# Patient Record
Sex: FEMALE | Race: WHITE | NOT HISPANIC OR LATINO | Employment: STUDENT | RURAL
[De-identification: names, ages, dates, MRNs, and addresses within clinical notes are randomized per-mention and may not be internally consistent; named-entity substitution may affect disease eponyms.]

---
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
## 2025-01-30 PROBLEM — J06.9 VIRAL UPPER RESPIRATORY TRACT INFECTION WITH COUGH: Status: RESOLVED | Noted: 2022-03-03 | Resolved: 2025-01-30

## 2025-02-27 ENCOUNTER — OFFICE VISIT (OUTPATIENT)
Dept: FAMILY MEDICINE | Facility: CLINIC | Age: 20
End: 2025-02-27
Payer: COMMERCIAL

## 2025-02-27 VITALS
HEIGHT: 68 IN | SYSTOLIC BLOOD PRESSURE: 110 MMHG | RESPIRATION RATE: 18 BRPM | TEMPERATURE: 99 F | WEIGHT: 152 LBS | OXYGEN SATURATION: 97 % | DIASTOLIC BLOOD PRESSURE: 74 MMHG | HEART RATE: 96 BPM | BODY MASS INDEX: 23.04 KG/M2

## 2025-02-27 DIAGNOSIS — J22 LRTI (LOWER RESPIRATORY TRACT INFECTION): ICD-10-CM

## 2025-02-27 DIAGNOSIS — J03.90 TONSILLITIS: Primary | ICD-10-CM

## 2025-02-27 LAB
CTP QC/QA: YES
MOLECULAR STREP A: NEGATIVE
POC MOLECULAR INFLUENZA A AGN: NEGATIVE
POC MOLECULAR INFLUENZA B AGN: NEGATIVE
SARS-COV-2 RDRP RESP QL NAA+PROBE: NEGATIVE

## 2025-02-27 PROCEDURE — 87502 INFLUENZA DNA AMP PROBE: CPT | Mod: QW,,, | Performed by: NURSE PRACTITIONER

## 2025-02-27 PROCEDURE — 87651 STREP A DNA AMP PROBE: CPT | Mod: QW,,, | Performed by: NURSE PRACTITIONER

## 2025-02-27 RX ORDER — DEXAMETHASONE SODIUM PHOSPHATE 4 MG/ML
6 INJECTION, SOLUTION INTRA-ARTICULAR; INTRALESIONAL; INTRAMUSCULAR; INTRAVENOUS; SOFT TISSUE
Status: COMPLETED | OUTPATIENT
Start: 2025-02-27 | End: 2025-02-27

## 2025-02-27 RX ORDER — AZITHROMYCIN 250 MG/1
TABLET, FILM COATED ORAL
Qty: 6 TABLET | Refills: 0 | Status: SHIPPED | OUTPATIENT
Start: 2025-02-27

## 2025-02-27 RX ORDER — CEFTRIAXONE 1 G/1
1 INJECTION, POWDER, FOR SOLUTION INTRAMUSCULAR; INTRAVENOUS
Status: COMPLETED | OUTPATIENT
Start: 2025-02-27 | End: 2025-02-27

## 2025-02-27 RX ADMIN — CEFTRIAXONE 1 G: 1 INJECTION, POWDER, FOR SOLUTION INTRAMUSCULAR; INTRAVENOUS at 09:02

## 2025-02-27 RX ADMIN — DEXAMETHASONE SODIUM PHOSPHATE 6 MG: 4 INJECTION, SOLUTION INTRA-ARTICULAR; INTRALESIONAL; INTRAMUSCULAR; INTRAVENOUS; SOFT TISSUE at 08:02

## 2025-02-27 NOTE — LETTER
February 27, 2025    Crow Izaguirre  [address]  [place] [address]             [facility]  Family Medicine  [address] [place] [address]  Phone: [phone]  Fax: [phone]   February 27, 2025     Patient: Crow Izaguirre   YOB: 2005   Date of Visit: 2/27/2025       To Whom it May Concern:    Crow Izaguirre was seen in my clinic on 2/27/2025. She may return to school on 03/03/2025 .    Please excuse her from any classes or work missed.    If you have any questions or concerns, please don't hesitate to call.    Sincerely,         Abigail Hernandez, NASHP

## 2025-02-27 NOTE — PROGRESS NOTES
[facility]  [address] [place] [address]  Phone: [phone]  Fax: [phone]       PATIENT NAME: Crow Izaguirre   : 2005    AGE: 19 y.o. DATE OF ENCOUNTER: 25    MRN: [id number]      PCP: Abigail Hernandez FNP    Subjective:   CHANGE CHIEF COMPLAINT      :[id number]}274}  Chief Complaint   Patient presents with    URI     Patient presents to clinic with complaints of sore throat, body aches, and fever since monday     History of Present Illness    HPI:  Patient became ill 4 days ago with intermittent fever, primarily occurring at night. Initial fever measurement was 102°F, but subsequent measurements with a different thermometer showed 99.2°F, suggesting a possible issue with one of the thermometers. She has been coughing and expectorating a small amount of sputum. She reports significant sinus pain and congestion, as well as severe throat pain. She had an earache yesterday. Patient does not recall being sick this year, but the clinician reminded her of an illness around [facility] in November, which required a follow-up evaluation at the [facility] about a week after the initial consultation.  She denies shortness of breath and wheezing. Tests for COVID-19 and influenza were negative.      ROS:  Constitutional: +fevers  ENT: +sinus pressure, +sore throat  Respiratory: -shortness of breath, +cough, -wheezing  Psychiatric: +anxiety         Allergies and Meds: 274}     Review of patient's allergies indicates:  No Known Allergies     Current Medications[1]    Labs:274}     Point Of Care Testing (if applicable):  Lab Results   Component Value Date    [id number] Negative 2025    FLUAMOLEC Negative 2025    FLUBMOLEC Negative 2025    MOLSTREPAPOC Negative 2025     Any diagnostic testing results obtained in office or prior to appointment were reviewed were reviewed with patient.    Medical History: 274}     Past Medical History:  "  Diagnosis Date    Dyslexia       Tobacco Use History[2]   Past Surgical History:   Procedure Laterality Date    KNEE ARTHROSCOPY W/ ACL RECONSTRUCTION Right 12/28/2021    Dr. Payne in [place]        Health Maintenance:      Health Maintenance Topics with due status: Not Due       Topic Last Completion Date    TETANUS VACCINE 08/30/2016    RSV Vaccine (Age 60+ and Pregnant patients) Not Due       Objective:  274}   Vital Signs  Temp: 98.8 °F (37.1 °C)  Temp Source: Oral  Pulse: 96  Resp: 18  SpO2: 97 %  BP: 110/74  BP Location: Left arm  Patient Position: Sitting  Pain Score: 0-No pain  Height and Weight  Height: 5' 7.99" (172.7 cm)  Weight: 68.9 kg (152 lb)  BSA (Calculated - sq m): 1.82 sq meters  BMI (Calculated): 23.1  Weight in (lb) to have BMI = 25: 164    Over the last two weeks how often have you been bothered by little interest or pleasure in doing things: 0  Over the last two weeks how often have you been bothered by feeling down, depressed or hopeless: 0  PHQ-2 Total Score: 0    Wt Readings from Last 3 Encounters:   02/27/25 68.9 kg (152 lb) (82%, Z= 0.91)*   11/27/24 70.3 kg (155 lb) (85%, Z= 1.02)*   11/21/24 71.8 kg (158 lb 6.4 oz) (87%, Z= 1.11)*     * Growth percentiles are based on CDC (Girls, 2-20 Years) data.     Physical Exam  Vitals and nursing note reviewed.   Constitutional:       General: She is not in acute distress.     Appearance: Normal appearance. She is ill-appearing.   HENT:      Head: Normocephalic.      Right Ear: Hearing, tympanic membrane, ear canal and external ear normal.      Left Ear: Hearing, tympanic membrane, ear canal and external ear normal.      Nose: Congestion present. No rhinorrhea.      Right Sinus: Maxillary sinus tenderness present. No frontal sinus tenderness.      Left Sinus: Maxillary sinus tenderness present. No frontal sinus tenderness.      Mouth/Throat:      Lips: Pink.      Mouth: Mucous membranes are moist. No oral lesions.      Tongue: No lesions.      " Pharynx: Uvula midline. Posterior oropharyngeal erythema and postnasal drip present. No pharyngeal swelling, oropharyngeal exudate or uvula swelling.      Tonsils: No tonsillar exudate or tonsillar abscesses. 3+ on the right. 1+ on the left.   Eyes:      General: No scleral icterus.        Right eye: No discharge.         Left eye: No discharge.      Conjunctiva/sclera: Conjunctivae normal.      Pupils: Pupils are equal, round, and reactive to light.   Cardiovascular:      Rate and Rhythm: Normal rate and regular rhythm.      Pulses: Normal pulses.      Heart sounds: Normal heart sounds.   Pulmonary:      Effort: Pulmonary effort is normal. No respiratory distress.      Breath sounds: Normal breath sounds. No wheezing, rhonchi or rales.   Musculoskeletal:      Cervical back: No rigidity.   Lymphadenopathy:      Cervical: Cervical adenopathy (anterior, bilateral) present.   Skin:     General: Skin is warm and dry.      Coloration: Skin is not jaundiced or pale.      Findings: No rash.   Neurological:      General: No focal deficit present.      Mental Status: She is alert and oriented to person, place, and time.      Gait: Gait normal.   Psychiatric:         Mood and Affect: Mood normal.         Behavior: Behavior normal.         Thought Content: Thought content normal.         Judgment: Judgment normal.          Assessment and Plan: 274}       1. Tonsillitis  -     dexAMETHasone injection 6 mg  -     cefTRIAXone injection 1 g  -     azithromycin (Z-AYDEN) 250 MG tablet; Take 2 tablets by mouth on day 1; Take 1 tablet by mouth on days 2-5  Dispense: 6 tablet; Refill: 0    2. LRTI (lower respiratory tract infection)  -     POCT COVID-19 Rapid Screening  -     POCT Influenza A/B Molecular  -     POCT Strep A, Molecular  -     dexAMETHasone injection 6 mg  -     cefTRIAXone injection 1 g  -     azithromycin (Z-AYDEN) 250 MG tablet; Take 2 tablets by mouth on day 1; Take 1 tablet by mouth on days 2-5  Dispense: 6 tablet;  Refill: 0        Assessment & Plan    IMPRESSION:  - Assessed patient with symptoms suggestive of strep throat, including enlarged tonsils (right tonsil notably swollen) and lymph nodes  - Considered possibility of strep infection not detectable by rapid strep A test  - Evaluated for potential respiratory infection and tonsillitis  - Decided on combination antibiotic therapy to cover various strains of strep and possible walking pneumonia    RESPIRATORY INFECTION WITH TONSILLITIS:  - Assessed the patient's condition as a respiratory infection with tonsillitis.  - Noted that the patient started feeling sick 4 days ago with fever, cough, and sinus pain.  - Recorded that patient reports fever up to 102°F at night, but 99.2°F with another thermometer.  - Performed physical exam revealing red and swollen throat, enlarged right tonsil, and enlarged lymph nodes.  - Auscultated chest and found it to be clear.  - Explained that there are multiple strains of strep bacteria, some of which are not detected by the rapid strep A test.  - Administered Rocephin injection during the visit.  - Administered Decadron injection during the visit.  - Prescribed Z-Prieto (Azithromycin).  - Recommend ibuprofen as needed for pain and fever.  - Recommend Tylenol as needed for pain and fever.  - Advised the patient to gargle with warm salt water to help soothe throat pain.  - Instructed the patient to change toothbrush in a few days.    POSSIBLE WALKING PNEUMONIA:  - Considered the possibility of walking pneumonia given the persistent cough.  - Noted that the patient reports coughing and producing a small amount of sputum.  - Auscultated chest and found it to be clear, but noted the presence of cough.  - Prescribed Z-Prieto (Azithromycin) and Rocephin to cover for possible walking pneumonia.    ANXIETY:  - Inquired about the patient's anxiety status.  - Noted that the patient reports improvement in anxiety.  - Acknowledged ongoing therapy for  anxiety.  - Recommend continuation of therapy for anxiety management.    PREVIOUS ILLNESS:  - Recalled the patient's previous illness in November.  - Reviewed the patient's history of previous illness.    FOLLOW UP:  - Scheduled follow-up on Monday if feeling better.  - Directed the patient to contact the office if symptoms worsen or do not improve.         Signature:  NASH Denise-BC      This note was generated with the assistance of ambient listening technology. Verbal consent was obtained by the patient and accompanying visitor(s) for the recording of patient appointment to facilitate this note. I attest to having reviewed and edited the generated note for accuracy, though some syntax or spelling errors may persist. Please contact the author of this note for any clarification.           [1]   Current Outpatient Medications   Medication Sig Dispense Refill    azithromycin (Z-AYDEN) 250 MG tablet Take 2 tablets by mouth on day 1; Take 1 tablet by mouth on days 2-5 6 tablet 0     No current facility-administered medications for this visit.   [2]   Social History  Tobacco Use   Smoking Status Never   Smokeless Tobacco Never

## 2025-05-12 ENCOUNTER — OFFICE VISIT (OUTPATIENT)
Dept: OTOLARYNGOLOGY | Facility: CLINIC | Age: 20
End: 2025-05-12
Payer: COMMERCIAL

## 2025-05-12 VITALS — HEIGHT: 68 IN | BODY MASS INDEX: 21.22 KG/M2 | WEIGHT: 140 LBS

## 2025-05-12 DIAGNOSIS — J35.01 CHRONIC TONSILLITIS: Primary | ICD-10-CM

## 2025-05-12 PROCEDURE — 1159F MED LIST DOCD IN RCRD: CPT | Mod: CPTII,,, | Performed by: OTOLARYNGOLOGY

## 2025-05-12 PROCEDURE — 1160F RVW MEDS BY RX/DR IN RCRD: CPT | Mod: CPTII,,, | Performed by: OTOLARYNGOLOGY

## 2025-05-12 PROCEDURE — 3008F BODY MASS INDEX DOCD: CPT | Mod: CPTII,,, | Performed by: OTOLARYNGOLOGY

## 2025-05-12 PROCEDURE — 99214 OFFICE O/P EST MOD 30 MIN: CPT | Mod: S$PBB,,, | Performed by: OTOLARYNGOLOGY

## 2025-05-12 PROCEDURE — 99213 OFFICE O/P EST LOW 20 MIN: CPT | Mod: PBBFAC | Performed by: OTOLARYNGOLOGY

## 2025-05-12 PROCEDURE — 99999 PR PBB SHADOW E&M-EST. PATIENT-LVL III: CPT | Mod: PBBFAC,,, | Performed by: OTOLARYNGOLOGY

## 2025-05-12 RX ORDER — AMOXICILLIN AND CLAVULANATE POTASSIUM 500; 125 MG/1; MG/1
1 TABLET, FILM COATED ORAL 2 TIMES DAILY
Qty: 28 TABLET | Refills: 0 | Status: SHIPPED | OUTPATIENT
Start: 2025-05-12

## 2025-05-12 NOTE — PROGRESS NOTES
Subjective:       Patient ID: Crow Izaguirre is a 19 y.o. female.    Chief Complaint: Sore Throat (Left side. Pt states her left tonsil is swollen and has red and white spots on it. )    Sore Throat       Review of Systems   HENT:  Positive for sore throat.    All other systems reviewed and are negative.      Objective:      Physical Exam  General: NAD  Head: Normocephalic, atraumatic, no facial asymmetry/normal strength,  Ears: Both auricules normal in appearance, w/o deformities tympanic membranes normal external auditory canals normal  Nose: External nose w/o deformities normal turbinates no drainage or inflammation  Oral Cavity: Lips, gums, floor of mouth, tongue hard palate, and buccal mucosa without mass/lesion Tonsils red with snall ? Ulcer left side   Oropharynx: Mucosa pink and moist, soft palate, posterior pharynx and oropharyngeal wall without mass/lesion  Neck: Supple, symmetric, trachea midline, no palpable mass/lesion, no palpable cervical lymphadenopathy  Skin: Warm and dry, no concerning lesions  Respiratory: Respirations even, unlabored    Assessment:       1. Chronic tonsillitis        Plan:       Acute tonsillitis will treat with augmentin